# Patient Record
Sex: FEMALE | Race: BLACK OR AFRICAN AMERICAN | Employment: UNEMPLOYED | URBAN - METROPOLITAN AREA
[De-identification: names, ages, dates, MRNs, and addresses within clinical notes are randomized per-mention and may not be internally consistent; named-entity substitution may affect disease eponyms.]

---
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
## 2021-03-19 ENCOUNTER — OFFICE VISIT (OUTPATIENT)
Dept: OBGYN CLINIC | Age: 21
End: 2021-03-19
Payer: COMMERCIAL

## 2021-03-19 VITALS
DIASTOLIC BLOOD PRESSURE: 60 MMHG | BODY MASS INDEX: 25.91 KG/M2 | HEIGHT: 68 IN | SYSTOLIC BLOOD PRESSURE: 90 MMHG | WEIGHT: 171 LBS

## 2021-03-19 DIAGNOSIS — Z11.3 SCREENING FOR STD (SEXUALLY TRANSMITTED DISEASE): Primary | ICD-10-CM

## 2021-03-19 PROCEDURE — 99212 OFFICE O/P EST SF 10 MIN: CPT | Performed by: ADVANCED PRACTICE MIDWIFE

## 2021-03-19 ASSESSMENT — ENCOUNTER SYMPTOMS
COUGH: 0
CONSTIPATION: 0
ABDOMINAL PAIN: 0
VOMITING: 0
DIARRHEA: 0
SHORTNESS OF BREATH: 0
NAUSEA: 0

## 2021-03-19 ASSESSMENT — PATIENT HEALTH QUESTIONNAIRE - PHQ9
SUM OF ALL RESPONSES TO PHQ QUESTIONS 1-9: 0
2. FEELING DOWN, DEPRESSED OR HOPELESS: 0

## 2021-03-19 NOTE — PROGRESS NOTES
Chief Complaint:     Beatriz Chew is a 24 y.o. female who presents here today for complaints of:      Chief Complaint   Patient presents with    Sexually Transmitted Diseases     est pt in today for sti screening. pt w/o complaints     History of Present Illness:     Screening for STD's - here today with complaints of:  Requesting screening for STD's. Past Medical, Surgical, and Family History: Allergies:  Patient has no known allergies. Patient's last menstrual period was 2021. Obstetrical History:     Contraceptive Method:  Nexplanon    Past Medical History:   Diagnosis Date    ADHD     Bipolar 1 disorder ([facility] [address]      History reviewed. No pertinent surgical history. Family History   Problem Relation Age of Onset    Breast Cancer Neg Hx     Cancer Neg Hx     Colon Cancer Neg Hx     Diabetes Neg Hx     Eclampsia Neg Hx     Ovarian Cancer Neg Hx     Hypertension Neg Hx      Labor Neg Hx     Spont Abortions Neg Hx     Stroke Neg Hx      Medications:     Current Outpatient Medications on File Prior to Visit   Medication Sig Dispense Refill    amphetamine-dextroamphetamine (ADDERALL) 5 MG tablet TAKE 1 TABLET BY MOUTH TWICE DAILY (one in the morning and one at noon)      buPROPion (WELLBUTRIN) 75 MG tablet TAKE 1/2 (ONE-HALF) OF A TABLET BY MOUTH IN THE MORNING      lithium (LITHOBID) 300 MG extended release tablet       QUEtiapine (SEROQUEL) 100 MG tablet TAKE 1 TABLET BY MOUTH ONCE DAILY      Etonogestrel (NEXPLANON SC) Inject into the skin       No current facility-administered medications on file prior to visit. Review of Systems:     Review of Systems   Constitutional: Negative for chills, fatigue and fever. Respiratory: Negative for cough and shortness of breath. Gastrointestinal: Negative for abdominal pain, constipation, diarrhea, nausea and vomiting.    Genitourinary: Negative for difficulty urinating, dysuria, menstrual problem, pelvic pain, vaginal bleeding and vaginal discharge. Neurological: Negative for dizziness and headaches. All other systems reviewed and are negative. Physical Exam:     Vitals:  BP 90/60   Ht 5' 8\" (1.727 m)   Wt 171 lb (77.6 kg)   LMP 02/26/2021   BMI 26.00 kg/m²     Physical Exam  Vitals signs and nursing note reviewed. Constitutional:       General: She is not in acute distress. Appearance: Normal appearance. She is not ill-appearing, toxic-appearing or diaphoretic. HENT:      Head: Normocephalic. Nose: No congestion or rhinorrhea. Mouth/Throat:      Mouth: Mucous membranes are moist.   Eyes:      General: No scleral icterus. Right eye: No discharge. Left eye: No discharge. Neck:      Musculoskeletal: Normal range of motion and neck supple. Cardiovascular:      Rate and Rhythm: Normal rate and regular rhythm. Pulses: Normal pulses. Pulmonary:      Effort: Pulmonary effort is normal. No respiratory distress. Abdominal:      Palpations: Abdomen is soft. Hernia: There is no hernia in the left inguinal area or right inguinal area. Genitourinary:     General: Normal vulva. Exam position: Lithotomy position. Pubic Area: No rash or pubic lice. Labia:         Right: No rash, tenderness, lesion or injury. Left: No rash, tenderness, lesion or injury. Urethra: No prolapse, urethral pain, urethral swelling or urethral lesion. Vagina: No signs of injury and foreign body. No vaginal discharge, erythema, tenderness, bleeding, lesions or prolapsed vaginal walls. Cervix: No cervical motion tenderness, discharge, friability, lesion, erythema, cervical bleeding or eversion. Uterus: Not deviated, not enlarged, not fixed, not tender and no uterine prolapse. Adnexa:         Right: No mass, tenderness or fullness. Left: No mass, tenderness or fullness. Rectum: No mass or external hemorrhoid.    Musculoskeletal: Normal range of motion. Right lower leg: No edema. Left lower leg: No edema. Lymphadenopathy:      Lower Body: No right inguinal adenopathy. No left inguinal adenopathy. Skin:     General: Skin is warm and dry. Capillary Refill: Capillary refill takes less than 2 seconds. Coloration: Skin is not jaundiced or pale. Neurological:      Mental Status: She is alert and oriented to person, place, and time. Mental status is at baseline. Motor: No weakness. Coordination: Coordination normal.      Gait: Gait normal.   Psychiatric:         Mood and Affect: Mood normal.         Behavior: Behavior normal.       Assessment:      Diagnosis Orders   1. Screening for STD (sexually transmitted disease)  Miscellaneous sendout 3     Plan:     1. Screening for STD's  Vaginal cultures collected    Follow Up:  Return if symptoms worsen or fail to improve. Orders Placed This Encounter   Procedures    Miscellaneous sendout 3     Standing Status:   Future     Standing Expiration Date:   3/19/2022     Order Specific Question:   Specify Req. Test (1 Test/Order)     Answer:   sti screening     No orders of the defined types were placed in this encounter.       MAEVE Guevara CNM

## 2021-03-19 NOTE — PROGRESS NOTES
Chief Complaint:     iK Toney is a 24 y.o. female who presents here today for complaints of:      Chief Complaint   Patient presents with    Sexually Transmitted Diseases     est pt in today for sti screening. pt w/o complaints     History of Present Illness:     Screening for STD's - here today with complaints of:  Here for STD screening.  Pain:  Denies   Sexual Health:  · Gender of sexual partners:  Male  · Use of barrier protection:  No  · Exposure to a partner with a known sexually transmitted disease within the last 90 days:  No  · Number of sexual contacts within the past 12 months:  3  · Personal past history  of sexually transmitted diseases:  No    Past Medical, Surgical, and Family History: Allergies:  Patient has no known allergies. Patient's last menstrual period was 2021. Obstetrical History:     Contraceptive Method:  Nexplanon    Past Medical History:   Diagnosis Date    ADHD     Bipolar 1 disorder ([facility] [address]      History reviewed. No pertinent surgical history. Family History   Problem Relation Age of Onset    Breast Cancer Neg Hx     Cancer Neg Hx     Colon Cancer Neg Hx     Diabetes Neg Hx     Eclampsia Neg Hx     Ovarian Cancer Neg Hx     Hypertension Neg Hx      Labor Neg Hx     Spont Abortions Neg Hx     Stroke Neg Hx      Medications:     Current Outpatient Medications on File Prior to Visit   Medication Sig Dispense Refill    amphetamine-dextroamphetamine (ADDERALL) 5 MG tablet TAKE 1 TABLET BY MOUTH TWICE DAILY (one in the morning and one at noon)      buPROPion (WELLBUTRIN) 75 MG tablet TAKE 1/2 (ONE-HALF) OF A TABLET BY MOUTH IN THE MORNING      lithium (LITHOBID) 300 MG extended release tablet       QUEtiapine (SEROQUEL) 100 MG tablet TAKE 1 TABLET BY MOUTH ONCE DAILY      Etonogestrel (NEXPLANON SC) Inject into the skin       No current facility-administered medications on file prior to visit.       Review of Systems:     Review of Systems  Physical Exam:     Vitals:  BP 90/60   Ht 5' 8\" (1.727 m)   Wt 171 lb (77.6 kg)   LMP 02/26/2021   BMI 26.00 kg/m²     Physical Exam  Assessment:      Diagnosis Orders   1. Screening for STD (sexually transmitted disease)  Miscellaneous sendout 3     Plan:     1. Screening for STD's  Vaginal cultures collected    Follow Up:  Return if symptoms worsen or fail to improve. Orders Placed This Encounter   Procedures    Miscellaneous sendout 3     Standing Status:   Future     Standing Expiration Date:   3/19/2022     Order Specific Question:   Specify Req. Test (1 Test/Order)     Answer:   sti screening     No orders of the defined types were placed in this encounter.       MAEVE Cottrell CNM

## 2021-03-24 DIAGNOSIS — Z11.3 SCREENING FOR STD (SEXUALLY TRANSMITTED DISEASE): ICD-10-CM

## 2021-06-22 ENCOUNTER — OFFICE VISIT (OUTPATIENT)
Dept: OBGYN CLINIC | Age: 21
End: 2021-06-22
Payer: COMMERCIAL

## 2021-06-22 VITALS
DIASTOLIC BLOOD PRESSURE: 62 MMHG | BODY MASS INDEX: 24.4 KG/M2 | WEIGHT: 161 LBS | SYSTOLIC BLOOD PRESSURE: 92 MMHG | HEIGHT: 68 IN

## 2021-06-22 DIAGNOSIS — Z11.3 ROUTINE SCREENING FOR STI (SEXUALLY TRANSMITTED INFECTION): Primary | ICD-10-CM

## 2021-06-22 PROCEDURE — 99212 OFFICE O/P EST SF 10 MIN: CPT | Performed by: OBSTETRICS & GYNECOLOGY

## 2021-06-22 RX ORDER — QUETIAPINE FUMARATE 50 MG/1
TABLET, FILM COATED ORAL
COMMUNITY
Start: 2021-06-11

## 2021-06-22 ASSESSMENT — ENCOUNTER SYMPTOMS
ALLERGIC/IMMUNOLOGIC NEGATIVE: 1
NAUSEA: 0
ABDOMINAL PAIN: 0
RESPIRATORY NEGATIVE: 1
BLOOD IN STOOL: 0
EYES NEGATIVE: 1
ANAL BLEEDING: 0
VOMITING: 0
CONSTIPATION: 0
RECTAL PAIN: 0
ABDOMINAL DISTENTION: 0
DIARRHEA: 0

## 2021-06-22 NOTE — PROGRESS NOTES
Exercise per Week:     Minutes of Exercise per Session:    Stress:     Feeling of Stress :    Social Connections:     Frequency of Communication with Friends and Family:     Frequency of Social Gatherings with Friends and Family:     Attends Gnosticism Services:     Active Member of Clubs or Organizations:     Attends Club or Organization Meetings:     Marital Status:    Intimate Partner Violence:     Fear of Current or Ex-Partner:     Emotionally Abused:     Physically Abused:     Sexually Abused:         Family History   Problem Relation Age of Onset    Breast Cancer Neg Hx     Cancer Neg Hx     Colon Cancer Neg Hx     Diabetes Neg Hx     Eclampsia Neg Hx     Ovarian Cancer Neg Hx     Hypertension Neg Hx      Labor Neg Hx     Spont Abortions Neg Hx     Stroke Neg Hx        Review of Systems   Constitutional: Negative. Negative for activity change, appetite change, chills, diaphoresis, fatigue, fever and unexpected weight change. HENT: Negative. Eyes: Negative. Respiratory: Negative. Cardiovascular: Negative. Gastrointestinal: Negative for abdominal distention, abdominal pain, anal bleeding, blood in stool, constipation, diarrhea, nausea, rectal pain and vomiting. Endocrine: Negative. Genitourinary: Negative for decreased urine volume, difficulty urinating, dyspareunia, dysuria, enuresis, flank pain, frequency, genital sores, hematuria, menstrual problem, pelvic pain, urgency, vaginal bleeding, vaginal discharge and vaginal pain. Musculoskeletal: Negative. Skin: Negative. Allergic/Immunologic: Negative. Neurological: Negative. Hematological: Negative. Psychiatric/Behavioral: Negative. Objective:     Physical Exam  Constitutional:       General: She is not in acute distress. Appearance: She is well-developed. She is not diaphoretic. HENT:      Head: Normocephalic and atraumatic.    Eyes:      Conjunctiva/sclera: Conjunctivae normal. Cardiovascular:      Rate and Rhythm: Normal rate and regular rhythm. Pulmonary:      Effort: Pulmonary effort is normal. No respiratory distress. Genitourinary:     Labia:         Right: No rash, tenderness, lesion or injury. Left: No rash, tenderness, lesion or injury. Vagina: Normal. No signs of injury and foreign body. No vaginal discharge, erythema, tenderness or bleeding. Comments: No abnormal discharge. No cervical or vaginal lesions. Normal external genitalia. Musculoskeletal:         General: No tenderness or deformity. Normal range of motion. Cervical back: Normal range of motion and neck supple. Skin:     General: Skin is warm and dry. Coloration: Skin is not pale. Neurological:      Mental Status: She is alert and oriented to person, place, and time. Motor: No abnormal muscle tone. Coordination: Coordination normal.   Psychiatric:         Behavior: Behavior normal.         Thought Content: Thought content normal.         Judgment: Judgment normal.         Assessment:          Diagnosis Orders   1. Routine screening for STI (sexually transmitted infection)  Miscellaneous sendout 3        Plan:      Medications placedthis encounter:  No orders of the defined types were placed in this encounter. Orders placedthis encounter:  Orders Placed This Encounter   Procedures    Miscellaneous sendout 3     Standing Status:   Future     Standing Expiration Date:   6/22/2022     Order Specific Question:   Specify Req. Test (1 Test/Order)     Answer:   cultures         Follow up:  Return for Annual.              The patient was asked if she would like a chaperone present for her intimate exam. She  Declined the chaperone.  Dianna Trevizo MD

## 2021-07-01 DIAGNOSIS — Z11.3 ROUTINE SCREENING FOR STI (SEXUALLY TRANSMITTED INFECTION): ICD-10-CM

## 2021-07-22 ENCOUNTER — TELEPHONE (OUTPATIENT)
Dept: OBGYN CLINIC | Age: 21
End: 2021-07-22

## 2021-07-22 NOTE — TELEPHONE ENCOUNTER
Pt received a "[employer] bill and was told to call our office regarding her bill. The bill for is $136.21 with DOS 6-22-21. "[employer] billing advised pt to call our office.

## 2022-12-14 ENCOUNTER — OFFICE VISIT (OUTPATIENT)
Dept: OBGYN CLINIC | Age: 22
End: 2022-12-14
Payer: COMMERCIAL

## 2022-12-14 VITALS
WEIGHT: 162 LBS | HEIGHT: 68 IN | DIASTOLIC BLOOD PRESSURE: 66 MMHG | BODY MASS INDEX: 24.55 KG/M2 | SYSTOLIC BLOOD PRESSURE: 100 MMHG

## 2022-12-14 DIAGNOSIS — Z11.51 SCREENING FOR HUMAN PAPILLOMAVIRUS: ICD-10-CM

## 2022-12-14 DIAGNOSIS — Z11.3 ROUTINE SCREENING FOR STI (SEXUALLY TRANSMITTED INFECTION): ICD-10-CM

## 2022-12-14 DIAGNOSIS — Z01.419 ENCOUNTER FOR WELL WOMAN EXAM WITH ROUTINE GYNECOLOGICAL EXAM: ICD-10-CM

## 2022-12-14 DIAGNOSIS — Z01.419 ENCOUNTER FOR WELL WOMAN EXAM WITH ROUTINE GYNECOLOGICAL EXAM: Primary | ICD-10-CM

## 2022-12-14 LAB
CLUE CELLS: ABNORMAL
REASON FOR REJECTION: NORMAL
REJECTED TEST: NORMAL
TRICHOMONAS PREP: ABNORMAL
YEAST WET PREP: ABNORMAL

## 2022-12-14 PROCEDURE — 99395 PREV VISIT EST AGE 18-39: CPT | Performed by: OBSTETRICS & GYNECOLOGY

## 2022-12-14 PROCEDURE — 99213 OFFICE O/P EST LOW 20 MIN: CPT | Performed by: OBSTETRICS & GYNECOLOGY

## 2022-12-14 ASSESSMENT — VISUAL ACUITY: OU: 1

## 2022-12-14 ASSESSMENT — ENCOUNTER SYMPTOMS
DIARRHEA: 0
ALLERGIC/IMMUNOLOGIC NEGATIVE: 1
RESPIRATORY NEGATIVE: 1
ABDOMINAL PAIN: 0
ANAL BLEEDING: 0
RECTAL PAIN: 0
CONSTIPATION: 0
NAUSEA: 0
ABDOMINAL DISTENTION: 0
BLOOD IN STOOL: 0
EYES NEGATIVE: 1
VOMITING: 0

## 2022-12-14 NOTE — PATIENT INSTRUCTIONS
Patient Education        Vaginal Yeast Infection: Care Instructions  Overview     A vaginal yeast infection is the growth of too many yeast cells in the vagina. This is a common problem. Itching, vaginal discharge and irritation, and other symptoms can bother you. But yeast infections don't often cause other health problems. Some medicines can increase your risk of getting a yeast infection. These include antibiotics, hormones, and steroids. You may also be more likely to get a yeast infection if you are pregnant, have diabetes, douche, or wear tight clothes. With treatment, most yeast infections get better in a few days. Follow-up care is a key part of your treatment and safety. Be sure to make and go to all appointments, and call your doctor if you are having problems. It's also a good idea to know your test results and keep a list of the medicines you take. How can you care for yourself at home? Take your medicines exactly as prescribed. Call your doctor if you think you are having a problem with your medicine. Ask your doctor about over-the-counter (OTC) medicines for yeast infections. If you use an OTC treatment, read and follow all instructions on the label. Don't use tampons while using a vaginal cream or suppository. The tampons can absorb the medicine. Use pads instead. Wear loose cotton clothing. Don't wear nylon or other fabric that holds body heat and moisture close to the skin. Try sleeping without underwear. Don't scratch. Relieve itching with a cold pack or a cool bath. Don't wash your vulva more than once a day. Use plain water or a mild, unscented soap. Air-dry the vulva. Change out of wet or damp clothes as soon as possible. If you are using a vaginal medicine, don't have sex until you have finished your treatment. But if you do have sex, don't depend on a condom or diaphragm for birth control. The oil in some vaginal medicines weakens latex.   Don't douche or use powders, sprays, or perfumes in your vagina or on your vulva. These items can change the normal balance of organisms in your vagina. When should you call for help? Call your doctor now or seek immediate medical care if:    You have new or increased pain in your vagina or pelvis. Watch closely for changes in your health, and be sure to contact your doctor if:    You have unexpected vaginal bleeding. You have a fever. You are not getting better after 2 days. Your symptoms come back after you finish your medicines. Where can you learn more? Go to http://www.woods.com/ and enter [address]636 to learn more about \"Vaginal Yeast Infection: Care Instructions. \"  Current as of: August 2, 2022               Content Version: 13.5  © 2006-2022 [employer]. Care instructions adapted under license by [facility] ([facility]). If you have questions about a medical condition or this instruction, always ask your healthcare professional. [address] any warranty or liability for your use of this information.

## 2022-12-14 NOTE — PROGRESS NOTES
Subjective:      Patient ID: Linda Lloyd is a 25 y.o. female    Annual exam.  Reviewed medical, surgical, social and family history. Also reviewed current medications and allergies. Normal cycles. Pap performed. STD screening offered. Monthly SBE encouraged. F/U annual or prn. Pt also wished to discuss 2 yeast infections in last few months. States first episode had white discharge and some itching and used Boric Acid with sx relief. Had sx again a week ago and did not treat thus far. States sx have improved last 2 days. SSE with thick, clumpy white discharge c/w yeast.  Wet prep and STD screening performed. Rx:  Diflucan 150 mg po q 3 days x 3 doses. Started acidophilus as well. Discussed decreasing carbs and sugars in diet. Daily probiotic ( Acidophilus ). Instructed to keep perineal and vaginal area clean and dry. Cotton underwear and loose fitting clothing. No douching. No bubble baths or bath bombs. All questions answered. Vitals:  /66   Ht 5' 8\" (1.727 m)   Wt 162 lb (73.5 kg)   LMP 11/15/2022   BMI 24.63 kg/m²   Past Medical History:   Diagnosis Date    ADHD     Bipolar 1 disorder ([facility] [address]      History reviewed. No pertinent surgical history. Allergies:  Patient has no known allergies. Current Outpatient Medications   Medication Sig Dispense Refill    QUEtiapine (SEROQUEL) 50 MG tablet TAKE 1 TABLET BY MOUTH AT BEDTIME      amphetamine-dextroamphetamine (ADDERALL) 5 MG tablet TAKE 1 TABLET BY MOUTH TWICE DAILY (one in the morning and one at noon)      buPROPion (WELLBUTRIN) 75 MG tablet TAKE 1/2 (ONE-HALF) OF A TABLET BY MOUTH IN THE MORNING      lithium (LITHOBID) 300 MG extended release tablet       Etonogestrel (NEXPLANON SC) Inject into the skin       No current facility-administered medications for this visit.      Social History     Socioeconomic History    Marital status: Single     Spouse name: Not on file    Number of children: Not on file    Years of education: Not on file    Highest education level: Not on file   Occupational History    Not on file   Tobacco Use    Smoking status: Never    Smokeless tobacco: Never   Substance and Sexual Activity    Alcohol use: Never    Drug use: Never    Sexual activity: Yes     Partners: Male     Birth control/protection: Implant   Other Topics Concern    Not on file   Social History Narrative    Not on file     Social Determinants of Health     Financial Resource Strain: Not on file   Food Insecurity: Not on file   Transportation Needs: Not on file   Physical Activity: Not on file   Stress: Not on file   Social Connections: Not on file   Intimate Partner Violence: Not on file   Housing Stability: Not on file     Family History   Problem Relation Age of Onset    Breast Cancer Neg Hx     Cancer Neg Hx     Colon Cancer Neg Hx     Diabetes Neg Hx     Eclampsia Neg Hx     Ovarian Cancer Neg Hx     Hypertension Neg Hx      Labor Neg Hx     Spont Abortions Neg Hx     Stroke Neg Hx        Review of Systems   Constitutional: Negative. Negative for activity change, appetite change, chills, diaphoresis, fatigue, fever and unexpected weight change. HENT: Negative. Eyes: Negative. Respiratory: Negative. Cardiovascular: Negative. Gastrointestinal:  Negative for abdominal distention, abdominal pain, anal bleeding, blood in stool, constipation, diarrhea, nausea, rectal pain and vomiting. Endocrine: Negative. Genitourinary:  Positive for vaginal discharge. Negative for decreased urine volume, difficulty urinating, dyspareunia, dysuria, enuresis, flank pain, frequency, genital sores, hematuria, menstrual problem, pelvic pain, urgency, vaginal bleeding and vaginal pain. Musculoskeletal: Negative. Skin: Negative. Allergic/Immunologic: Negative. Neurological: Negative. Hematological: Negative. Psychiatric/Behavioral: Negative.        Objective:     Physical Exam  Constitutional:       Appearance: She is well-developed. HENT:      Head: Normocephalic. Eyes:      General: Lids are normal. Vision grossly intact. Neck:      Thyroid: No thyromegaly. Cardiovascular:      Rate and Rhythm: Normal rate and regular rhythm. Heart sounds: Normal heart sounds. Pulmonary:      Effort: Pulmonary effort is normal. No respiratory distress. Breath sounds: Normal breath sounds. No wheezing or rales. Chest:      Chest wall: No tenderness. Breasts:     Right: Normal. No swelling, bleeding, inverted nipple, mass, nipple discharge, skin change or tenderness. Left: Normal. No swelling, bleeding, inverted nipple, mass, nipple discharge, skin change or tenderness. Abdominal:      General: There is no distension. Palpations: Abdomen is soft. There is no mass. Tenderness: There is no abdominal tenderness. There is no guarding or rebound. Hernia: No hernia is present. There is no hernia in the left inguinal area or right inguinal area. Genitourinary:     General: Normal vulva. Pubic Area: No rash. Labia:         Right: No rash, tenderness, lesion or injury. Left: No rash, tenderness, lesion or injury. Urethra: No prolapse, urethral swelling or urethral lesion. Vagina: No signs of injury and foreign body. Vaginal discharge present. No erythema, tenderness or bleeding. Cervix: No cervical motion tenderness, discharge or friability. Uterus: Not deviated, not enlarged, not fixed and not tender. Adnexa:         Right: No mass, tenderness or fullness. Left: No mass, tenderness or fullness. Rectum: Normal.      Comments: Thick white discharge c/w yeast.   Musculoskeletal:         General: No tenderness. Normal range of motion. Cervical back: Normal range of motion and neck supple. Lymphadenopathy:      Cervical: No cervical adenopathy. Upper Body:      Right upper body: No supraclavicular or axillary adenopathy.       Left upper body: No supraclavicular or axillary adenopathy. Lower Body: No right inguinal adenopathy. No left inguinal adenopathy. Skin:     General: Skin is warm and dry. Coloration: Skin is not pale. Findings: No erythema or rash. Neurological:      Mental Status: She is alert and oriented to person, place, and time. Psychiatric:         Behavior: Behavior normal.         Thought Content: Thought content normal.         Judgment: Judgment normal.       Assessment:       Diagnosis Orders   1. Encounter for well woman exam with routine gynecological exam  PAP SMEAR      2. Routine screening for STI (sexually transmitted infection)  Wet prep, genital    C.trachomatis N.gonorrhoeae DNA      3. Screening for human papillomavirus  PAP SMEAR           Plan:      Medications placedthis encounter:  No orders of the defined types were placed in this encounter. Orders placedthis encounter:  Orders Placed This Encounter   Procedures    Wet prep, genital     Standing Status:   Future     Standing Expiration Date:   12/14/2023    C.trachomatis N.gonorrhoeae DNA     Standing Status:   Future     Standing Expiration Date:   12/14/2023    PAP SMEAR     Patient History:    No LMP recorded. OBGYN Status: Having periods  History reviewed. No pertinent surgical history. Medications/Contraceptives Affecting Cytology     Progestin Contraceptives - Implants Disp Start End     Etonogestrel (NEXPLANON SC)          Class: Historical Med        Social History    Tobacco Use      Smoking status: Never      Smokeless tobacco: Never       Standing Status:   Future     Standing Expiration Date:   12/14/2023     Order Specific Question:   Collection Type     Answer: Thin Prep     Order Specific Question:   Prior Abnormal Pap Test     Answer:   No     Order Specific Question:   Screening or Diagnostic     Answer:   Screening     Order Specific Question:   HPV Requested?      Answer:   Yes     Order Specific Question:   High Risk Patient     Answer:   N/A         Follow up:  Return in about 1 year (around 12/14/2023) for Annual.  Repeat Annual GYN exam every 1 year. Cervical Cytology exam starts age 24. If Negative Cytology;  follow-up screening per current guidelines. Mammograms yearly starting at age 36. Calcium and Vitamin D dosing reviewed ( age appropriate ). Colonoscopy and bone density screening discussed ( age appropriate ). Birth control and STD prevention discussed ( age appropriate ). Gardisil counseling completed for all patients ( age appropriate ). Routine health maintenance ( per PCP and guidelines ).

## 2022-12-14 NOTE — PROGRESS NOTES
The patient was asked if she would like a chaperone present for her intimate exam. She  Declined the chaperone.  Ifeoma Richter CMA ([address] [place] [address])

## 2022-12-16 ENCOUNTER — TELEPHONE (OUTPATIENT)
Dept: OBGYN CLINIC | Age: 22
End: 2022-12-16

## 2022-12-16 NOTE — TELEPHONE ENCOUNTER
----- Message from Donaciano Seip, MD sent at 12/15/2022  8:30 AM EST -----  + BV. Flagyl 500 mg po bid x 7 days.

## 2022-12-17 LAB
C TRACH DNA GENITAL QL NAA+PROBE: NEGATIVE
HPV COMMENT: NORMAL
HPV TYPE 16: NOT DETECTED
HPV TYPE 18: NOT DETECTED
HPVOH (OTHER TYPES): NOT DETECTED
N. GONORRHOEAE DNA: NEGATIVE

## 2022-12-19 RX ORDER — METRONIDAZOLE 500 MG/1
500 TABLET ORAL 2 TIMES DAILY
Qty: 14 TABLET | Refills: 0 | Status: SHIPPED | OUTPATIENT
Start: 2022-12-19 | End: 2022-12-26

## 2022-12-19 NOTE — TELEPHONE ENCOUNTER
Received call from patient asking about medication,informed of prior message,patient pharmacy is the same on file,    [facility], [address] [facility] -  [phone]

## 2023-04-27 ENCOUNTER — OFFICE VISIT (OUTPATIENT)
Dept: OBGYN CLINIC | Age: 23
End: 2023-04-27
Payer: COMMERCIAL

## 2023-04-27 VITALS
DIASTOLIC BLOOD PRESSURE: 70 MMHG | HEIGHT: 68 IN | BODY MASS INDEX: 25.76 KG/M2 | SYSTOLIC BLOOD PRESSURE: 100 MMHG | WEIGHT: 170 LBS

## 2023-04-27 DIAGNOSIS — Z30.09 ENCOUNTER FOR OTHER GENERAL COUNSELING OR ADVICE ON CONTRACEPTION: Primary | ICD-10-CM

## 2023-04-27 PROCEDURE — 99212 OFFICE O/P EST SF 10 MIN: CPT | Performed by: OBSTETRICS & GYNECOLOGY

## 2023-04-27 SDOH — ECONOMIC STABILITY: HOUSING INSECURITY
IN THE LAST 12 MONTHS, WAS THERE A TIME WHEN YOU DID NOT HAVE A STEADY PLACE TO SLEEP OR SLEPT IN A SHELTER (INCLUDING NOW)?: NO

## 2023-04-27 SDOH — ECONOMIC STABILITY: INCOME INSECURITY: HOW HARD IS IT FOR YOU TO PAY FOR THE VERY BASICS LIKE FOOD, HOUSING, MEDICAL CARE, AND HEATING?: NOT HARD AT ALL

## 2023-04-27 SDOH — ECONOMIC STABILITY: FOOD INSECURITY: WITHIN THE PAST 12 MONTHS, YOU WORRIED THAT YOUR FOOD WOULD RUN OUT BEFORE YOU GOT MONEY TO BUY MORE.: NEVER TRUE

## 2023-04-27 SDOH — ECONOMIC STABILITY: FOOD INSECURITY: WITHIN THE PAST 12 MONTHS, THE FOOD YOU BOUGHT JUST DIDN'T LAST AND YOU DIDN'T HAVE MONEY TO GET MORE.: NEVER TRUE

## 2023-04-27 ASSESSMENT — PATIENT HEALTH QUESTIONNAIRE - PHQ9
1. LITTLE INTEREST OR PLEASURE IN DOING THINGS: 0
SUM OF ALL RESPONSES TO PHQ QUESTIONS 1-9: 0
SUM OF ALL RESPONSES TO PHQ QUESTIONS 1-9: 0
2. FEELING DOWN, DEPRESSED OR HOPELESS: 0
SUM OF ALL RESPONSES TO PHQ9 QUESTIONS 1 & 2: 0
SUM OF ALL RESPONSES TO PHQ QUESTIONS 1-9: 0
SUM OF ALL RESPONSES TO PHQ QUESTIONS 1-9: 0

## 2023-04-27 ASSESSMENT — ENCOUNTER SYMPTOMS
ANAL BLEEDING: 0
ABDOMINAL PAIN: 0
NAUSEA: 0
VOMITING: 0
DIARRHEA: 0
CONSTIPATION: 0
ABDOMINAL DISTENTION: 0
EYES NEGATIVE: 1
ALLERGIC/IMMUNOLOGIC NEGATIVE: 1
BLOOD IN STOOL: 0
RESPIRATORY NEGATIVE: 1
RECTAL PAIN: 0

## 2023-04-27 NOTE — PROGRESS NOTES
Patient here to discuss removal of current Nexplanon ( in x 4 years ) and replacement. Reviewed medical, surgical, social and family history. Also reviewed current medications and allergies. No issues with Nexplanon. No Gyn complaints. Needs to use another form of protection ( condoms ) until new implant placed. Ordered and will call when available for placement. All questions answered. Vitals:  /70   Ht 5' 8\" (1.727 m)   Wt 170 lb (77.1 kg)   BMI 25.85 kg/m²   Past Medical History:   Diagnosis Date    ADHD     Bipolar 1 disorder ([facility] [address]      History reviewed. No pertinent surgical history. Allergies:  Patient has no known allergies. Current Outpatient Medications   Medication Sig Dispense Refill    QUEtiapine (SEROQUEL) 50 MG tablet TAKE 1 TABLET BY MOUTH AT BEDTIME      amphetamine-dextroamphetamine (ADDERALL) 5 MG tablet TAKE 1 TABLET BY MOUTH TWICE DAILY (one in the morning and one at noon)      buPROPion (WELLBUTRIN) 75 MG tablet TAKE 1/2 (ONE-HALF) OF A TABLET BY MOUTH IN THE MORNING      lithium (LITHOBID) 300 MG extended release tablet       Etonogestrel (NEXPLANON SC) Inject into the skin       No current facility-administered medications for this visit.      Social History     Socioeconomic History    Marital status: Single     Spouse name: Not on file    Number of children: Not on file    Years of education: Not on file    Highest education level: Not on file   Occupational History    Not on file   Tobacco Use    Smoking status: Never    Smokeless tobacco: Never   Substance and Sexual Activity    Alcohol use: Never    Drug use: Never    Sexual activity: Yes     Partners: Male     Birth control/protection: Implant   Other Topics Concern    Not on file   Social History Narrative    Not on file     Social Determinants of Health     Financial Resource Strain: Low Risk     Difficulty of Paying Living Expenses: Not hard at all   Food Insecurity: No Food Insecurity    Worried About Running

## 2023-05-17 ENCOUNTER — TELEPHONE (OUTPATIENT)
Dept: OBGYN CLINIC | Age: 23
End: 2023-05-17

## 2023-05-17 NOTE — TELEPHONE ENCOUNTER
ZEV-  Received a call from Janey Chaney (Torsten Ryan) who called to schedule shipment for Nexplanon. Caller stated shipment for Nexplanons will be scheduled prior shipment. Nexplanon for patient will be shipped on May 23,2023. Address was provided to caller.

## 2023-05-31 ENCOUNTER — PROCEDURE VISIT (OUTPATIENT)
Dept: OBGYN CLINIC | Age: 23
End: 2023-05-31

## 2023-05-31 VITALS
BODY MASS INDEX: 26.22 KG/M2 | HEIGHT: 68 IN | DIASTOLIC BLOOD PRESSURE: 62 MMHG | SYSTOLIC BLOOD PRESSURE: 126 MMHG | WEIGHT: 173 LBS

## 2023-05-31 DIAGNOSIS — Z30.46 ENCOUNTER FOR REMOVAL AND REINSERTION OF NEXPLANON: Primary | ICD-10-CM

## 2023-05-31 DIAGNOSIS — N92.0 EXCESSIVE OR FREQUENT MENSTRUATION: ICD-10-CM

## 2023-05-31 RX ORDER — BUPROPION HYDROCHLORIDE 300 MG/1
300 TABLET ORAL DAILY
COMMUNITY
Start: 2023-05-15

## 2023-05-31 RX ORDER — QUETIAPINE 400 MG/1
400 TABLET, FILM COATED, EXTENDED RELEASE ORAL NIGHTLY
COMMUNITY
Start: 2023-05-15

## 2023-05-31 RX ORDER — LISDEXAMFETAMINE DIMESYLATE 10 MG/1
CAPSULE ORAL
COMMUNITY
Start: 2023-05-09

## 2023-05-31 ASSESSMENT — ENCOUNTER SYMPTOMS
EYES NEGATIVE: 1
NAUSEA: 0
BLOOD IN STOOL: 0
VOMITING: 0
CONSTIPATION: 0
RECTAL PAIN: 0
ANAL BLEEDING: 0
ABDOMINAL PAIN: 0
RESPIRATORY NEGATIVE: 1
ABDOMINAL DISTENTION: 0
DIARRHEA: 0
ALLERGIC/IMMUNOLOGIC NEGATIVE: 1

## 2023-05-31 NOTE — PROGRESS NOTES
Patient here for Nexplanon removal and placement. Reviewed medical, surgical, social and family history. Area in right arm prepped with betadine. Site injected with 3-5 cc Lidociane 1% without epinephrine. Small incision made over tip of Nexplanon implant. Implant removed with hemostat without difficulty. Non-dominant arm ( right ) measured per  recommendation for placement of Nexplanon. Area prepped with betadine and injected with 1% lidocaine with epinephrine. Using removal incision, Nexplanon placed per  instructions without difficulty. Good hemostasis. Patient tolerated procedure well. Dressing placed. Discharge care instructions and precautions discussed and all questions answered. F/U  2 weeks. Vitals:  /62   Ht 5' 8\" (1.727 m)   Wt 173 lb (78.5 kg)   LMP 05/21/2023   BMI 26.30 kg/m²   Past Medical History:   Diagnosis Date    ADHD     Bipolar 1 disorder ([facility] [address]      History reviewed. No pertinent surgical history. Allergies:  Patient has no known allergies.   Current Outpatient Medications   Medication Sig Dispense Refill    buPROPion (WELLBUTRIN XL) 300 MG extended release tablet Take 1 tablet by mouth daily      VYVANSE 10 MG capsule start WITH ONE CAPSULE BY MOUTH ONCE DAILY for 5 (FIVE) days then increase to 2 (TWO) CAPSULES THEREAFTER as tolerated      QUEtiapine (SEROQUEL XR) 400 MG extended release tablet Take 1 tablet by mouth nightly      lithium (LITHOBID) 300 MG extended release tablet       Etonogestrel (NEXPLANON SC) Inject into the skin       Current Facility-Administered Medications   Medication Dose Route Frequency Provider Last Rate Last Admin    etonogestrel (NEXPLANON) implant 68 mg  68 mg Subdermal Once Juan Jose Barbosa MD   68 mg at 05/31/23 0917     Social History     Socioeconomic History    Marital status: Single     Spouse name: Not on file    Number of children: Not on file    Years of education: Not on file    Highest

## 2025-07-23 ASSESSMENT — ENCOUNTER SYMPTOMS
COUGH: 0
SORE THROAT: 0
SHORTNESS OF BREATH: 0
DIARRHEA: 0
VOICE CHANGE: 0
CONSTIPATION: 0
TROUBLE SWALLOWING: 0
VOMITING: 0
NAUSEA: 0
RHINORRHEA: 0
ABDOMINAL PAIN: 0

## 2025-07-23 NOTE — PROGRESS NOTES
Chief Complaint:     Barrie Moyer is a 25 y.o. female who presents here today for:      Chief Complaint   Patient presents with    Annual Exam     pap    Orders     Patient would like to have Hep C and HIV testing done. No risk of exposure at this time.      History of Present Illness:     Barrie Moyer is a 25 y.o. female who presents for her annual exam.      Dysmenorrhea  Utilizing hormonal contraception to relieve uncomfortable menstrual symptoms.    Dysmenorrhea symptoms have been occurring for greater than 1 year.  Happy with current contraceptive method, wishes to continue.      Past Medical History:     Allergies:  Patient has no known allergies.  Patient's last menstrual period was 2025 (approximate).  Obstetrical History:       Past Medical History:   Diagnosis Date    ADHD     Bipolar 1 disorder (HCC)      Medications:     Current Outpatient Medications on File Prior to Visit   Medication Sig Dispense Refill    Adapalene-Benzoyl Peroxide 0.3-2.5 % GEL Apply topically daily      VITAMIN D, CHOLECALCIFEROL, 25 MCG (1000 UT) CAPS Take 2 capsules by mouth daily      clindamycin (CLEOCIN T) 1 % lotion Apply topically 2 times daily      clonazePAM (KLONOPIN) 0.5 MG tablet Take 1 tablet by mouth 2 times daily as needed for Anxiety.      omega-3 acid ethyl esters (LOVAZA) 1 g capsule Take 1 capsule by mouth daily      buPROPion (WELLBUTRIN XL) 300 MG extended release tablet Take 1 tablet by mouth daily      VYVANSE 10 MG capsule start WITH ONE CAPSULE BY MOUTH ONCE DAILY for 5 (FIVE) days then increase to 2 (TWO) CAPSULES THEREAFTER as tolerated      QUEtiapine (SEROQUEL XR) 400 MG extended release tablet Take 1 tablet by mouth nightly      lithium (LITHOBID) 300 MG extended release tablet       Etonogestrel (NEXPLANON SC) Inject into the skin       Current Facility-Administered Medications on File Prior to Visit   Medication Dose Route Frequency Provider Last Rate Last Admin    etonogestrel

## 2025-07-25 ENCOUNTER — OFFICE VISIT (OUTPATIENT)
Dept: OBGYN CLINIC | Age: 25
End: 2025-07-25
Payer: COMMERCIAL

## 2025-07-25 VITALS
HEIGHT: 68 IN | SYSTOLIC BLOOD PRESSURE: 116 MMHG | HEART RATE: 80 BPM | WEIGHT: 183 LBS | DIASTOLIC BLOOD PRESSURE: 74 MMHG | BODY MASS INDEX: 27.74 KG/M2

## 2025-07-25 DIAGNOSIS — Z01.419 WOMEN'S ANNUAL ROUTINE GYNECOLOGICAL EXAMINATION: Primary | ICD-10-CM

## 2025-07-25 DIAGNOSIS — Z11.3 SCREENING EXAMINATION FOR STD (SEXUALLY TRANSMITTED DISEASE): ICD-10-CM

## 2025-07-25 DIAGNOSIS — Z11.51 SCREENING FOR HUMAN PAPILLOMAVIRUS: ICD-10-CM

## 2025-07-25 LAB
HEPATITIS C ANTIBODY: NONREACTIVE
HIV AG/AB: NONREACTIVE

## 2025-07-25 PROCEDURE — 99395 PREV VISIT EST AGE 18-39: CPT | Performed by: MIDWIFE

## 2025-07-25 RX ORDER — OMEGA-3-ACID ETHYL ESTERS 1 G/1
1 CAPSULE, LIQUID FILLED ORAL DAILY
COMMUNITY
Start: 2025-05-23

## 2025-07-25 RX ORDER — ADAPALENE AND BENZOYL PEROXIDE 3; 25 MG/G; MG/G
GEL TOPICAL DAILY
COMMUNITY
Start: 2025-04-18

## 2025-07-25 RX ORDER — FAMOTIDINE 20 MG
2 TABLET ORAL DAILY
COMMUNITY
Start: 2025-05-23

## 2025-07-25 RX ORDER — CLONAZEPAM 0.5 MG/1
0.5 TABLET ORAL 2 TIMES DAILY PRN
COMMUNITY
Start: 2025-07-01

## 2025-07-25 RX ORDER — CLINDAMYCIN PHOSPHATE 10 UG/ML
LOTION TOPICAL 2 TIMES DAILY
COMMUNITY
Start: 2025-04-18

## 2025-07-25 SDOH — ECONOMIC STABILITY: FOOD INSECURITY: WITHIN THE PAST 12 MONTHS, THE FOOD YOU BOUGHT JUST DIDN'T LAST AND YOU DIDN'T HAVE MONEY TO GET MORE.: NEVER TRUE

## 2025-07-25 SDOH — ECONOMIC STABILITY: FOOD INSECURITY: WITHIN THE PAST 12 MONTHS, YOU WORRIED THAT YOUR FOOD WOULD RUN OUT BEFORE YOU GOT MONEY TO BUY MORE.: NEVER TRUE

## 2025-07-25 ASSESSMENT — PATIENT HEALTH QUESTIONNAIRE - PHQ9
SUM OF ALL RESPONSES TO PHQ QUESTIONS 1-9: 0
1. LITTLE INTEREST OR PLEASURE IN DOING THINGS: NOT AT ALL
SUM OF ALL RESPONSES TO PHQ QUESTIONS 1-9: 0
2. FEELING DOWN, DEPRESSED OR HOPELESS: NOT AT ALL

## 2025-07-26 LAB
CLUE CELLS VAG QL WET PREP: NORMAL
T VAGINALIS VAG QL WET PREP: NORMAL
TRICHOMONAS VAGINALIS SCREEN: NEGATIVE
YEAST VAG QL WET PREP: NORMAL

## 2025-07-29 LAB
C TRACH DNA CVX QL NAA+PROBE: NEGATIVE
N GONORRHOEA DNA CERV MUCUS QL NAA+PROBE: NEGATIVE

## 2025-07-30 LAB
HPV HR 12 DNA SPEC QL NAA+PROBE: NOT DETECTED
HPV16 DNA SPEC QL NAA+PROBE: NOT DETECTED
HPV16+18+H RISK 12 DNA SPEC-IMP: NORMAL
HPV18 DNA SPEC QL NAA+PROBE: NOT DETECTED

## 2025-07-31 ENCOUNTER — RESULTS FOLLOW-UP (OUTPATIENT)
Dept: OBGYN CLINIC | Age: 25
End: 2025-07-31